# Patient Record
Sex: FEMALE | Race: AMERICAN INDIAN OR ALASKA NATIVE
[De-identification: names, ages, dates, MRNs, and addresses within clinical notes are randomized per-mention and may not be internally consistent; named-entity substitution may affect disease eponyms.]

---

## 2018-06-30 ENCOUNTER — HOSPITAL ENCOUNTER (EMERGENCY)
Dept: HOSPITAL 5 - ED | Age: 33
Discharge: HOME | End: 2018-06-30
Payer: COMMERCIAL

## 2018-06-30 VITALS — SYSTOLIC BLOOD PRESSURE: 113 MMHG | DIASTOLIC BLOOD PRESSURE: 69 MMHG
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
## 2018-06-30 DIAGNOSIS — S16.1XXA: Primary | ICD-10-CM
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
## 2018-06-30 DIAGNOSIS — M54.5: ICD-10-CM

## 2018-06-30 PROCEDURE — 72100 X-RAY EXAM L-S SPINE 2/3 VWS: CPT

## 2018-06-30 PROCEDURE — 72040 X-RAY EXAM NECK SPINE 2-3 VW: CPT
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
## 2020-04-13 ENCOUNTER — HOSPITAL ENCOUNTER (EMERGENCY)
Dept: HOSPITAL 5 - ED | Age: 35
Discharge: HOME | End: 2020-04-13
Payer: COMMERCIAL

## 2020-04-13 VITALS — DIASTOLIC BLOOD PRESSURE: 83 MMHG | SYSTOLIC BLOOD PRESSURE: 126 MMHG
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
## 2020-04-13 DIAGNOSIS — Z79.899: ICD-10-CM
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
## 2020-04-13 DIAGNOSIS — S16.1XXA: Primary | ICD-10-CM

## 2020-04-13 PROCEDURE — 99283 EMERGENCY DEPT VISIT LOW MDM: CPT

## 2020-04-13 PROCEDURE — 72040 X-RAY EXAM NECK SPINE 2-3 VW: CPT
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
## 2022-05-25 ENCOUNTER — HOSPITAL ENCOUNTER (EMERGENCY)
Dept: HOSPITAL 5 - ED | Age: 37
Discharge: LEFT BEFORE DISCHARGE | End: 2022-05-25
Payer: COMMERCIAL

## 2022-05-25 ENCOUNTER — HOSPITAL ENCOUNTER (EMERGENCY)
Dept: HOSPITAL 5 - ED | Age: 37
Discharge: LEFT BEFORE DISCHARGE | End: 2022-05-25
Payer: SELF-PAY

## 2022-05-25 ENCOUNTER — HOSPITAL ENCOUNTER (EMERGENCY)
Dept: HOSPITAL 5 - ED | Age: 37
LOS: 1 days | Discharge: HOME | End: 2022-05-26
Payer: SELF-PAY

## 2022-05-25 VITALS — SYSTOLIC BLOOD PRESSURE: 112 MMHG | DIASTOLIC BLOOD PRESSURE: 71 MMHG

## 2022-05-25 VITALS — DIASTOLIC BLOOD PRESSURE: 82 MMHG | SYSTOLIC BLOOD PRESSURE: 123 MMHG

## 2022-05-25 DIAGNOSIS — E11.9: ICD-10-CM

## 2022-05-25 DIAGNOSIS — N76.0: Primary | ICD-10-CM

## 2022-05-25 DIAGNOSIS — N93.9: Primary | ICD-10-CM

## 2022-05-25 DIAGNOSIS — N39.0: ICD-10-CM

## 2022-05-25 DIAGNOSIS — Z53.21: ICD-10-CM

## 2022-05-25 DIAGNOSIS — N39.0: Primary | ICD-10-CM

## 2022-05-25 DIAGNOSIS — B96.89: ICD-10-CM

## 2022-05-25 DIAGNOSIS — F17.200: ICD-10-CM

## 2022-05-25 DIAGNOSIS — Z79.899: ICD-10-CM

## 2022-05-25 DIAGNOSIS — A59.9: ICD-10-CM

## 2022-05-25 LAB
BACTERIA #/AREA URNS HPF: (no result) /HPF
BILIRUB UR QL STRIP: (no result)
BILIRUB UR QL STRIP: (no result)
BLOOD UR QL VISUAL: (no result)
BLOOD UR QL VISUAL: (no result)
MUCOUS THREADS #/AREA URNS HPF: (no result) /HPF
MUCOUS THREADS #/AREA URNS HPF: (no result) /HPF
PH UR STRIP: 5 [PH] (ref 5–7)
PH UR STRIP: 5 [PH] (ref 5–7)
PROT UR STRIP-MCNC: (no result) MG/DL
PROT UR STRIP-MCNC: (no result) MG/DL
RBC #/AREA URNS HPF: 13 /HPF (ref 0–6)
RBC #/AREA URNS HPF: 6 /HPF (ref 0–6)
UROBILINOGEN UR-MCNC: < 2 MG/DL (ref ?–2)
UROBILINOGEN UR-MCNC: < 2 MG/DL (ref ?–2)
WBC #/AREA URNS HPF: 18 /HPF (ref 0–6)
WBC #/AREA URNS HPF: 48 /HPF (ref 0–6)

## 2022-05-25 PROCEDURE — 36415 COLL VENOUS BLD VENIPUNCTURE: CPT

## 2022-05-25 PROCEDURE — 99284 EMERGENCY DEPT VISIT MOD MDM: CPT

## 2022-05-25 PROCEDURE — 87210 SMEAR WET MOUNT SALINE/INK: CPT

## 2022-05-25 PROCEDURE — 99282 EMERGENCY DEPT VISIT SF MDM: CPT

## 2022-05-25 PROCEDURE — 87086 URINE CULTURE/COLONY COUNT: CPT

## 2022-05-25 PROCEDURE — 81001 URINALYSIS AUTO W/SCOPE: CPT

## 2022-05-25 PROCEDURE — 81025 URINE PREGNANCY TEST: CPT

## 2022-05-25 PROCEDURE — 96372 THER/PROPH/DIAG INJ SC/IM: CPT

## 2022-05-25 PROCEDURE — 87591 N.GONORRHOEAE DNA AMP PROB: CPT

## 2022-05-25 PROCEDURE — 85027 COMPLETE CBC AUTOMATED: CPT

## 2022-05-25 NOTE — EMERGENCY DEPARTMENT REPORT
ED Dysuria HPI





- HPI


Chief Complaint: Urogenital-Female


Stated Complaint: VAGINAL BLEEDING


Time Seen by Provider: 05/25/22 12:44


Duration: 2 Days


Location of Discomfort: Suprapubic


Severity: Mild


Symptoms: Dysuria: Yes, Frequency: No, Suprapubic Pain: No, Flank Pain: No, 

Fever: No, Hematuria: No, Abdominal Pain: No, Previous UTI's: No


Other History: Patient is a 37-year-old that comes to the ER with dysuria.  

Denies fever or chills.  Denies nausea vomiting.  Denies back pain.





ED Review of Systems


ROS: 


Stated complaint: VAGINAL BLEEDING


Other details as noted in HPI





Comment: All other systems reviewed and negative





ED Past Medical Hx





- Past Medical History


Previous Medical History?: No





- Surgical History


Past Surgical History?: No





- Family History


Family history: no significant





- Social History


Smoking Status: Never Smoker


Substance Use Type: None





- Medications


Home Medications: 


                                Home Medications











 Medication  Instructions  Recorded  Confirmed  Last Taken  Type


 


Ondansetron [Zofran Odt] 8 mg PO Q8H #15 tab.rapdis 12/30/13  Unknown Rx


 


Cyclobenzaprine [Flexeril] 10 mg PO TID PRN #15 tablet 06/30/18  Unknown Rx


 


Ibuprofen [Motrin] 600 mg PO Q8H PRN #15 tablet 06/30/18  Unknown Rx


 


Cyclobenzaprine [Flexeril] 10 mg PO BID PRN #20 tablet 04/13/20  Unknown Rx


 


Menthol/Camphor [Tiger Balm 1 applicatio TP QID PRN #1 tube 04/13/20  Unknown Rx





Ointment]     


 


Naproxen 500 mg PO BID PRN #30 tablet 04/13/20  Unknown Rx














Dysuria Exam





- Exam


General: 


Vital signs noted. No distress. Alert and acting appropriately.





Exam: Yes Moist Mucous Membranes, No CVA Tenderness, No Abdominal Tenderness, No

Rigidity or Guarding


Labs: 


                                   Lab Results











  05/25/22 Range/Units





  Unknown 


 


Urine Color  Yellow  (Yellow)  


 


Urine Turbidity  Hazy  (Clear)  


 


Urine pH  5.0  (5.0-7.0)  


 


Ur Specific Gravity  1.013  (1.003-1.030)  


 


Urine Protein  <15 mg/dl  (Negative)  mg/dL


 


Urine Glucose (UA)  50  (Negative)  mg/dL


 


Urine Ketones  20  (Negative)  mg/dL


 


Urine Blood  Neg  (Negative)  


 


Urine Nitrite  Neg  (Negative)  


 


Urine Bilirubin  Neg  (Negative)  


 


Urine Urobilinogen  < 2.0  (<2.0)  mg/dL


 


Ur Leukocyte Esterase  Lg  (Negative)  


 


Urine WBC (Auto)  18.0 H  (0.0-6.0)  /HPF


 


Urine RBC (Auto)  6.0  (0.0-6.0)  /HPF


 


U Epithel Cells (Auto)  11.0  (0-13.0)  /HPF


 


Urine Bacteria (Auto)  1+  (Negative)  /HPF


 


Urine Mucus  1+  /HPF


 


Urine HCG, Qual  Negative  (Negative)  














ED Course


                                   Vital Signs











  05/25/22





  12:40


 


Temperature 98.2 F


 


Pulse Rate 88


 


Respiratory 18





Rate 


 


Blood Pressure 112/71


 


O2 Sat by Pulse 98





Oximetry 














ED Medical Decision Making





- Medical Decision Making





                                   Lab Results











  05/25/22 Range/Units





  Unknown 


 


Urine Color  Yellow  (Yellow)  


 


Urine Turbidity  Hazy  (Clear)  


 


Urine pH  5.0  (5.0-7.0)  


 


Ur Specific Gravity  1.013  (1.003-1.030)  


 


Urine Protein  <15 mg/dl  (Negative)  mg/dL


 


Urine Glucose (UA)  50  (Negative)  mg/dL


 


Urine Ketones  20  (Negative)  mg/dL


 


Urine Blood  Neg  (Negative)  


 


Urine Nitrite  Neg  (Negative)  


 


Urine Bilirubin  Neg  (Negative)  


 


Urine Urobilinogen  < 2.0  (<2.0)  mg/dL


 


Ur Leukocyte Esterase  Lg  (Negative)  


 


Urine WBC (Auto)  18.0 H  (0.0-6.0)  /HPF


 


Urine RBC (Auto)  6.0  (0.0-6.0)  /HPF


 


U Epithel Cells (Auto)  11.0  (0-13.0)  /HPF


 


Urine Bacteria (Auto)  1+  (Negative)  /HPF


 


Urine Mucus  1+  /HPF


 


Urine HCG, Qual  Negative  (Negative)  








                                   Vital Signs











  05/25/22





  12:40


 


Temperature 98.2 F


 


Pulse Rate 88


 


Respiratory 18





Rate 


 


Blood Pressure 112/71


 


O2 Sat by Pulse 98





Oximetry 








Unable to locate patient at 1310





- Differential Diagnosis


Rule out UTI/pregnancy


Critical care attestation.: 


If time is entered above; I have spent that time in minutes in the direct care 

of this critically ill patient, excluding procedure time.








ED Disposition


Clinical Impression: 


 UTI (urinary tract infection)





Disposition: 07 LEFT WITHOUT BEING SEEN


Is pt being admited?: No


Does the pt Need Aspirin: No


Condition: Stable


Time of Disposition: 13:14

## 2022-05-26 VITALS — DIASTOLIC BLOOD PRESSURE: 82 MMHG | SYSTOLIC BLOOD PRESSURE: 128 MMHG

## 2022-05-26 LAB
BILIRUB UR QL STRIP: (no result)
BLOOD UR QL VISUAL: (no result)
HCT VFR BLD CALC: 38.5 % (ref 30.3–42.9)
HGB BLD-MCNC: 13.1 GM/DL (ref 10.1–14.3)
MCHC RBC AUTO-ENTMCNC: 34 % (ref 30–34)
MCV RBC AUTO: 78 FL (ref 79–97)
MUCOUS THREADS #/AREA URNS HPF: (no result) /HPF
PH UR STRIP: 5 [PH] (ref 5–7)
PLATELET # BLD: 333 K/MM3 (ref 140–440)
PROT UR STRIP-MCNC: (no result) MG/DL
RBC # BLD AUTO: 4.92 M/MM3 (ref 3.65–5.03)
RBC #/AREA URNS HPF: 141 /HPF (ref 0–6)
UROBILINOGEN UR-MCNC: < 2 MG/DL (ref ?–2)
WBC #/AREA URNS HPF: 26 /HPF (ref 0–6)

## 2022-05-26 NOTE — EMERGENCY DEPARTMENT REPORT
ED Female  HPI





- General


Chief complaint: Vaginal Bleeding


Stated complaint: VAGINAL BLEEDING


Source: patient


Mode of arrival: Ambulatory


Limitations: No Limitations





- History of Present Illness


Initial comments: 


37-year-old female presents to the ED complaining of dysuria ,urgency  and a 

foul odor x3 weeks.  Patient states that she believes she has a urinary tract 

infection.  Patient states she has been self-medicating with over-the-counter 

Azo and drinking plenty of water without any relief.  States that she noticed a 

foul odor and a clear discharge when she wiped.  She states she is having 

unprotected sex.  Patient denies any abdominal pain at present time.  Patient d

enies any fever ,chills ,nausea or vomiting at present time.  Patient is alert 

and oriented x3.  No acute distress noted.  No ill appearance noted.





MD Complaint: dysuria


-: month(s)


Severity: mild


Severity scale (0 -10): 5


Quality: burning


Consistency: intermittent


Improves with: none


Worsens with: urination, intercourse


Are you Pregnant Now?: No


Associated Symptoms: denies other symptoms





- Related Data


                                  Previous Rx's











 Medication  Instructions  Recorded  Last Taken  Type


 


Ondansetron [Zofran Odt] 8 mg PO Q8H #15 tab.rapdis 12/30/13 Unknown Rx


 


Cyclobenzaprine [Flexeril] 10 mg PO TID PRN #15 tablet 06/30/18 Unknown Rx


 


Ibuprofen [Motrin] 600 mg PO Q8H PRN #15 tablet 06/30/18 Unknown Rx


 


Cyclobenzaprine [Flexeril] 10 mg PO BID PRN #20 tablet 04/13/20 Unknown Rx


 


Menthol/Camphor [Tiger Balm 1 applicatio TP QID PRN #1 tube 04/13/20 Unknown Rx





Ointment]    


 


Naproxen 500 mg PO BID PRN #30 tablet 04/13/20 Unknown Rx


 


Sulfamethoxazole/Trimethoprim 1 each PO BID 10 Days #20 tab 05/26/22 Unknown Rx





[Bactrim DS TAB]    


 


metroNIDAZOLE [Flagyl] 500 mg PO Q12HR 7 Days #14 tab 05/26/22 Unknown Rx











                                    Allergies











Allergy/AdvReac Type Severity Reaction Status Date / Time


 


No Known Allergies Allergy   Verified 05/26/22 00:16














ED Review of Systems


ROS: 


Stated complaint: VAGINAL BLEEDING


Other details as noted in HPI





Constitutional: denies: chills, fever


Eyes: denies: eye pain, eye discharge, vision change


ENT: denies: ear pain, throat pain


Respiratory: denies: cough, shortness of breath, wheezing


Cardiovascular: denies: chest pain, palpitations


Endocrine: no symptoms reported


Gastrointestinal: denies: abdominal pain, nausea, diarrhea


Genitourinary: urgency, dysuria.  denies: discharge


Musculoskeletal: denies: back pain, joint swelling, arthralgia


Skin: denies: rash, lesions


Neurological: denies: headache, weakness, paresthesias


Psychiatric: denies: anxiety, depression


Hematological/Lymphatic: denies: easy bleeding, easy bruising





ED Past Medical Hx





- Past Medical History


Previous Medical History?: Yes


Hx Diabetes: Yes


Additional medical history: Sicle cell crisis, Anemia, Fibriods





- Surgical History


Past Surgical History?: No





- Social History


Smoking Status: Current Every Day Smoker


Substance Use Type: Other





- Medications


Home Medications: 


                                Home Medications











 Medication  Instructions  Recorded  Confirmed  Last Taken  Type


 


Ondansetron [Zofran Odt] 8 mg PO Q8H #15 tab.rapdis 12/30/13  Unknown Rx


 


Cyclobenzaprine [Flexeril] 10 mg PO TID PRN #15 tablet 06/30/18  Unknown Rx


 


Ibuprofen [Motrin] 600 mg PO Q8H PRN #15 tablet 06/30/18  Unknown Rx


 


Cyclobenzaprine [Flexeril] 10 mg PO BID PRN #20 tablet 04/13/20  Unknown Rx


 


Menthol/Camphor [Tiger Balm 1 applicatio TP QID PRN #1 tube 04/13/20  Unknown Rx





Ointment]     


 


Naproxen 500 mg PO BID PRN #30 tablet 04/13/20  Unknown Rx


 


Sulfamethoxazole/Trimethoprim 1 each PO BID 10 Days #20 tab 05/26/22  Unknown Rx





[Bactrim DS TAB]     


 


metroNIDAZOLE [Flagyl] 500 mg PO Q12HR 7 Days #14 tab 05/26/22  Unknown Rx














ED Physical Exam





- General


Limitations: No Limitations


General appearance: alert, in no apparent distress





- Head


Head exam: Present: atraumatic, normocephalic





- Eye


Eye exam: Present: normal appearance





- ENT


ENT exam: Present: mucous membranes moist





- Neck


Neck exam: Present: normal inspection





- Respiratory


Respiratory exam: Present: normal lung sounds bilaterally.  Absent: respiratory 

distress





- Cardiovascular


Cardiovascular Exam: Present: regular rate, normal rhythm.  Absent: systolic 

murmur, diastolic murmur, rubs, gallop





- GI/Abdominal


GI/Abdominal exam: Present: soft, normal bowel sounds





- 


External exam: Present: normal external exam


Speculum exam: Present: normal speculum exam.  Absent: erythema, vaginal 

discharge, vaginal bleeding


Bi-manual exam: Present: normal bi-manual exam





- Extremities Exam


Extremities exam: Present: normal inspection





- Back Exam


Back exam: Present: normal inspection





- Neurological Exam


Neurological exam: Present: alert, oriented X3





- Psychiatric


Psychiatric exam: Present: normal affect, normal mood





- Skin


Skin exam: Present: warm, dry, intact, normal color.  Absent: rash





ED Course


                                   Vital Signs











  05/26/22 05/26/22





  00:10 10:09


 


Temperature 98.4 F 96.1 F L


 


Pulse Rate 98 H 66


 


Respiratory 18 16





Rate  


 


Blood Pressure 123/89 


 


Blood Pressure  128/82





[Left]  


 


O2 Sat by Pulse 97 100





Oximetry  














ED Medical Decision Making





- Lab Data


Result diagrams: 


                                 05/26/22 08:46








- Medical Decision Making


37-year-old female presents to the ED complaining of dysuria ,urgency  and a 

foul odor x3 weeks.  Patient states that she believes she has a urinary tract 

infection.  Patient states she has been self-medicating with over-the-counter 

Azo and drinking plenty of water without any relief.  States that she noticed a 

foul odor and a clear discharge when she wiped.  She states she is having 

unprotected sex.  Patient denies any abdominal pain at present time.  Patient 

denies any fever ,chills ,nausea or vomiting at present time.  Patient is alert 

and oriented x3.  No acute distress noted.  No ill appearance noted.  Physical 

examination pelvic exam showed white discharge.  Will empirically treat patient 

for gonorrhea chlamydia.  Wet prep shows clue cells greater than 20 and moderate

 trichomonas.





Rechecked the patient is resting quietly quietly and comfortable and feeling 

better. I discussed the results of diagnostic study, my clinical impression and 

the plan for further treatment with the patient. Patient agrees with plan and 

discharge at this present time. All question addressed.





I have given the patient instruction regarding a diagnosis ,expectation ,follow-

up and return precaution. I explained to the patient that emergent condition may

 arise and to return to the ED for new worsen and any new persisting condition. 

I have explained the importance of following up with the primary care physician 

or referral physician listed below has instructed. The patient verbalized 

understanding of discharge instruction.











                              Abnormal Lab Results











  05/26/22 05/26/22





  08:46 Unknown


 


WBC  8.9 


 


RBC  4.92 


 


Hgb  13.1 


 


Hct  38.5 


 


MCV  78 L 


 


MCH  27 L 


 


MCHC  34 


 


RDW  17.2 H 


 


Plt Count  333 


 


Urine Color   Yellow


 


Urine Turbidity   Cloudy


 


Urine pH   5.0


 


Ur Specific Gravity   1.013


 


Urine Protein   <15 mg/dl


 


Urine Glucose (UA)   50


 


Urine Ketones   Neg


 


Urine Blood   Lg


 


Urine Nitrite   Neg


 


Ur Reducing Substances   Not Reportable


 


Urine Bilirubin   Neg


 


Urine Ictotest   Not Reportable


 


Urine Urobilinogen   < 2.0


 


Ur Leukocyte Esterase   Lg


 


Urine WBC (Auto)   26.0 H


 


Urine RBC (Auto)   141.0


 


U Epithel Cells (Auto)   25.0 H


 


Urine Mucus   Few


 


Urine HCG, Qual   Negative











Critical care attestation.: 


If time is entered above; I have spent that time in minutes in the direct care 

of this critically ill patient, excluding procedure time.








ED Disposition


Clinical Impression: 


 Bacterial vaginosis, Trichomonas infection, Acute urinary tract infection





Disposition: 01 HOME / SELF CARE / HOMELESS


Is pt being admited?: No


Does the pt Need Aspirin: No


Condition: Stable


Instructions:  Antibiotic Medicine, Adult, Easy-to-Read, Urinary Tract I

nfection, Adult, Easy-to-Read, Bacterial Vaginosis, Easy-to-Read, 

Trichomoniasis, Bacterial Vaginosis (ED)


Additional Instructions: 


Take medication as prescribed


retun to the ED for any worsening symptoms


Have your partner treated to prevent reinfection


Prescriptions: 


Sulfamethoxazole/Trimethoprim [Bactrim DS TAB] 1 each PO BID 10 Days #20 tab


metroNIDAZOLE [Flagyl] 500 mg PO Q12HR 7 Days #14 tab


Referrals: 


LUANA MARTINEZ MD [Primary Care Provider] - 3-5 Days


[facility] [Outside] - 3-5 Days


Forms:  STI Treatment and Prevention


Time of Disposition: 09:32
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
